# Patient Record
Sex: MALE | Race: WHITE | URBAN - METROPOLITAN AREA
[De-identification: names, ages, dates, MRNs, and addresses within clinical notes are randomized per-mention and may not be internally consistent; named-entity substitution may affect disease eponyms.]

---

## 2020-01-01 ENCOUNTER — TELEPHONE (OUTPATIENT)
Dept: TRANSPLANT | Facility: CLINIC | Age: 55
End: 2020-01-01

## 2020-01-01 ENCOUNTER — HISTORICAL (OUTPATIENT)
Dept: ADMINISTRATIVE | Facility: HOSPITAL | Age: 55
End: 2020-01-01

## 2020-01-01 LAB
% SATURATION: 89.1 %
ABS NEUT (OLG): 10.17 X10(3)/MCL (ref 2.1–9.2)
ALBUMIN SERPL-MCNC: 2.7 GM/DL (ref 3.4–5)
ALBUMIN SERPL-MCNC: 3.4 GM/DL (ref 3.4–5)
ALBUMIN/GLOB SERPL: 0.8 RATIO (ref 1.1–2)
ALBUMIN/GLOB SERPL: 1.1 RATIO (ref 1.1–2)
ALP SERPL-CCNC: 224 UNIT/L (ref 45–117)
ALP SERPL-CCNC: 238 UNIT/L (ref 45–117)
ALT SERPL-CCNC: 1728 UNIT/L (ref 12–78)
ALT SERPL-CCNC: 1735 UNIT/L (ref 12–78)
AMMONIA PLAS-MSCNC: <10 MMOL/L (ref 11–32)
AMPHET UR QL SCN: NEGATIVE
APAP SERPL-MCNC: <2 MCG/ML (ref 10–30)
APPEARANCE, UA: ABNORMAL
APTT PPP: 29.2 SECOND(S) (ref 23.3–37)
AST SERPL-CCNC: 1765 UNIT/L (ref 15–37)
AST SERPL-CCNC: 1920 UNIT/L (ref 15–37)
B-OH-BUTYR SERPL-MCNC: 1.52 MMOL/L (ref 0–0.3)
BACTERIA #/AREA URNS AUTO: ABNORMAL /HPF
BARBITURATE SCN PRESENT UR: NEGATIVE
BASOPHILS # BLD AUTO: 0 X10(3)/MCL (ref 0–0.2)
BASOPHILS NFR BLD AUTO: 0 %
BENZODIAZ UR QL SCN: NEGATIVE
BILIRUB SERPL-MCNC: 7.5 MG/DL (ref 0.2–1)
BILIRUB SERPL-MCNC: 8 MG/DL (ref 0.2–1)
BILIRUB UR QL STRIP: 0.5 MG/DL
BILIRUBIN DIRECT+TOT PNL SERPL-MCNC: 2.1 MG/DL
BILIRUBIN DIRECT+TOT PNL SERPL-MCNC: 2.4 MG/DL
BILIRUBIN DIRECT+TOT PNL SERPL-MCNC: 5.4 MG/DL (ref 0–0.2)
BILIRUBIN DIRECT+TOT PNL SERPL-MCNC: 5.6 MG/DL (ref 0–0.2)
BUN SERPL-MCNC: 46 MG/DL (ref 7–18)
BUN SERPL-MCNC: 46 MG/DL (ref 7–18)
CALCIUM SERPL-MCNC: 7.5 MG/DL (ref 8.5–10.1)
CALCIUM SERPL-MCNC: 7.9 MG/DL (ref 8.5–10.1)
CANNABINOIDS UR QL SCN: NEGATIVE
CHLORIDE SERPL-SCNC: 80 MMOL/L (ref 98–107)
CHLORIDE SERPL-SCNC: 85 MMOL/L (ref 98–107)
CK MB SERPL-MCNC: 6.2 NG/ML (ref 1–3.6)
CK SERPL-CCNC: 526 UNIT/L (ref 39–308)
CO HGB VEN: 4 % (ref 0.5–1.5)
CO2 SERPL-SCNC: 14 MMOL/L (ref 21–32)
CO2 SERPL-SCNC: 21 MMOL/L (ref 21–32)
COCAINE UR QL SCN: NEGATIVE
COLOR UR: YELLOW
CREAT SERPL-MCNC: 2.1 MG/DL (ref 0.6–1.3)
CREAT SERPL-MCNC: 2.2 MG/DL (ref 0.6–1.3)
D BASE VENOUS: -12.8 (ref -2–2)
ERYTHROCYTE [DISTWIDTH] IN BLOOD BY AUTOMATED COUNT: 14.9 % (ref 11.5–14.5)
EST. AVERAGE GLUCOSE BLD GHB EST-MCNC: 289 MG/DL
ETHANOL SERPL-MCNC: <3 MG/DL
FINAL CULTURE: NO GROWTH
FINAL CULTURE: NORMAL
FINAL CULTURE: NORMAL
FLUAV AG UPPER RESP QL IA.RAPID: NEGATIVE
FLUBV AG UPPER RESP QL IA.RAPID: NEGATIVE
GLOBULIN SER-MCNC: 3 GM/ML (ref 2.3–3.5)
GLOBULIN SER-MCNC: 3.3 GM/ML (ref 2.3–3.5)
GLUCOSE (UA): >1000 MG/DL
GLUCOSE SERPL-MCNC: 215 MG/DL (ref 74–106)
GLUCOSE SERPL-MCNC: 253 MG/DL (ref 74–106)
HAV IGM SERPL QL IA: NONREACTIVE
HBA1C MFR BLD: 11.7 % (ref 4.2–6.3)
HBV CORE IGM SERPL QL IA: NONREACTIVE
HBV SURFACE AG SERPL QL IA: NONREACTIVE
HCO3 VENOUS: 14 MMOL/L (ref 25–40)
HCT VFR BLD AUTO: 53.5 % (ref 40–51)
HCV AB SERPL QL IA: NONREACTIVE
HGB BLD-MCNC: 17.7 GM/DL (ref 13.5–17.5)
HGB UR QL STRIP: NEGATIVE
HYALINE CASTS #/AREA URNS LPF: ABNORMAL /LPF
IMM GRANULOCYTES # BLD AUTO: 0.16 10*3/UL
IMM GRANULOCYTES NFR BLD AUTO: 1 %
INR PPP: 2.57 (ref 0.9–1.2)
KETONES UR QL STRIP: 20 MG/DL
LACTATE SERPL-SCNC: 9.7 MMOL/L (ref 0.4–2)
LACTATE SERPL-SCNC: 9.8 MMOL/L (ref 0.4–2)
LEUKOCYTE ESTERASE UR QL STRIP: NEGATIVE
LIPASE SERPL-CCNC: 255 UNIT/L (ref 73–393)
LYMPHOCYTES # BLD AUTO: 1.3 X10(3)/MCL (ref 0.6–4.6)
LYMPHOCYTES NFR BLD AUTO: 10 %
Lab: ABNORMAL
MAGNESIUM SERPL-MCNC: 2.6 MG/DL (ref 1.6–2.6)
MCH RBC QN AUTO: 32.7 PG (ref 26–34)
MCHC RBC AUTO-ENTMCNC: 33.1 GM/DL (ref 31–37)
MCV RBC AUTO: 98.7 FL (ref 80–100)
MET HGB VEN: 0.3 % (ref 0–1.5)
MONOCYTES # BLD AUTO: 0.8 X10(3)/MCL (ref 0.1–1.3)
MONOCYTES NFR BLD AUTO: 6 %
NEUTROPHILS # BLD AUTO: 10.17 X10(3)/MCL (ref 2.1–9.2)
NEUTROPHILS NFR BLD AUTO: 82 %
NITRITE UR QL STRIP: NEGATIVE
O2 HGB VENOUS: 85.4 % (ref 40–85)
OPIATES UR QL SCN: NEGATIVE
PCO2 VENOUS: 35 MMHG (ref 41–51)
PCP UR QL: NEGATIVE
PH UR STRIP.AUTO: 5.5 [PH] (ref 5–8)
PH UR STRIP: 5.5 [PH] (ref 4.5–8)
PH VENOUS: 7.21 (ref 7.32–7.42)
PHOSPHATE SERPL-MCNC: 6.5 MG/DL (ref 2.5–4.9)
PLATELET # BLD AUTO: 186 X10(3)/MCL (ref 130–400)
PMV BLD AUTO: 12.3 FL (ref 7.4–10.4)
PO2 VENOUS: 64 MMHG (ref 30–100)
POTASSIUM SERPL-SCNC: 6 MMOL/L (ref 3.5–5.1)
POTASSIUM SERPL-SCNC: 6 MMOL/L (ref 3.5–5.1)
PROT SERPL-MCNC: 6 GM/DL (ref 6.4–8.2)
PROT SERPL-MCNC: 6.4 GM/DL (ref 6.4–8.2)
PROT UR QL STRIP: 70 MG/DL
PROTHROMBIN TIME: 27.7 SECOND(S) (ref 11.9–14.4)
RBC # BLD AUTO: 5.42 X10(6)/MCL (ref 4.5–5.9)
RBC #/AREA URNS AUTO: ABNORMAL /HPF
SAMPLE VEN: ABNORMAL
SITE VEN: ABNORMAL
SODIUM SERPL-SCNC: 119 MMOL/L (ref 136–145)
SODIUM SERPL-SCNC: 122 MMOL/L (ref 136–145)
SP GR UR STRIP: 1.01 (ref 1–1.03)
SQUAMOUS #/AREA URNS LPF: ABNORMAL /LPF
TEMPERATURE, URINE (OHS): 25 DEGC (ref 20–25)
THB VEN: 17.6 GM/DL (ref 12–18)
TREATMENT VEN: ABNORMAL
TROPONIN I SERPL-MCNC: 1.83 NG/ML (ref 0–0.05)
TROPONIN I SERPL-MCNC: 2.05 NG/ML (ref 0–0.05)
UROBILINOGEN UR STRIP-ACNC: 4 MG/DL
WBC # SPEC AUTO: 12.4 X10(3)/MCL (ref 4.5–11)
WBC #/AREA URNS AUTO: ABNORMAL /HPF

## 2020-03-16 NOTE — TELEPHONE ENCOUNTER
----- Message from Ashley Sanchez sent at 3/12/2020  7:42 AM CDT -----  Regarding: RE: Dr Hernandez appt request   Booker Thompson,     I was advised this was a discharge / NP appt. Not transfer?     Pt insurance in system is Medicaid and was verified as active.     Please advise if I can be of any assistance.    Sincerely,  Ashley Sanchez   [facility]    [id number]     ----- Message -----  From: Donna Stanford  Sent: 3/11/2020   4:12 PM CDT  To: Ashley Sanchez  Subject: RE: Dr Hernandez appt request                        It appears they tried to transfer him in bed to bed but no insurance.  I will have to see if he ever got ins.   ----- Message -----  From: Ashley Sanchez  Sent: 3/11/2020   3:26 PM CDT  To: [facility] Referral [facility]  Subject: Dr Hernandez appt request                            Hi TXP      Mike Farmer MD is requesting a Hospital F/U with Dr Hernandez    dx transplant ctr dx liver toxicity (see records in media for additional hx)     Can you plz ctc the pt to schedule an appt?     The records and referral are in media mgr.     I'll check chart for updates and epic for appt.     Thank you for all you do on our behalf.     Sincerely,  Ashley Sanchez   [facility]    [id number]

## 2020-05-01 ENCOUNTER — POST MORTEM DOCUMENTATION (OUTPATIENT)
Dept: TRANSPLANT | Facility: CLINIC | Age: 55
End: 2020-05-01